# Patient Record
Sex: FEMALE | URBAN - METROPOLITAN AREA
[De-identification: names, ages, dates, MRNs, and addresses within clinical notes are randomized per-mention and may not be internally consistent; named-entity substitution may affect disease eponyms.]

---

## 2017-06-08 ENCOUNTER — OFFICE VISIT (OUTPATIENT)
Dept: OBGYN CLINIC | Facility: CLINIC | Age: 37
End: 2017-06-08

## 2017-06-08 VITALS — WEIGHT: 145 LBS | DIASTOLIC BLOOD PRESSURE: 76 MMHG | SYSTOLIC BLOOD PRESSURE: 117 MMHG | HEART RATE: 78 BPM

## 2017-06-08 DIAGNOSIS — B37.3 VULVOVAGINITIS DUE TO YEAST: ICD-10-CM

## 2017-06-08 DIAGNOSIS — N89.8 VAGINAL ITCHING: Primary | ICD-10-CM

## 2017-06-08 PROCEDURE — 99204 OFFICE O/P NEW MOD 45 MIN: CPT | Performed by: OBSTETRICS & GYNECOLOGY

## 2017-06-08 NOTE — PROGRESS NOTES
HPI:    Patient ID: Ann Lua is a 39year old female. HPI  New patient GYN problem consultation  Complains of a year long history of vaginal itching, burning and thin skin of the vaginal opening with superficial tears or abrasions.   She has not had bl than a year ago. She denies having any history of herpes or bacterial vaginosis or Trichomonas or chlamydia or gonorrhea. Her obstetric history significant for a  section.   She use some Estrace vaginal cream at one point and is vague on whether i

## (undated) NOTE — MR AVS SNAPSHOT
[facility] (formerly [facility])  [address] [place] [address]  [phone]               Thank you for choosing us for your health care visit with Juany Lopez MD.  We are glad to serve you and happy to provide you with this summary of your visit. Educational Information     Healthy Diet and Regular Exercise  [employer] [facility] [address] for making healthy food choices  -   Enjoy your food, but eat less. Fully enjoy your food when eating. Don’t eat while distracted and slow down.    Avoid

## (undated) NOTE — Clinical Note
6/13/2017              Ann Lua        [address] [place]         Dear Trisha Bell,      It was a pleasure to see you at our [address]  office.   Your lab tests were normal.  There is no need for